# Patient Record
Sex: MALE | Race: WHITE | NOT HISPANIC OR LATINO | URBAN - METROPOLITAN AREA
[De-identification: names, ages, dates, MRNs, and addresses within clinical notes are randomized per-mention and may not be internally consistent; named-entity substitution may affect disease eponyms.]

---

## 2017-08-08 ENCOUNTER — EMERGENCY (EMERGENCY)
Facility: HOSPITAL | Age: 32
LOS: 1 days | Discharge: HOME | End: 2017-08-08
Attending: EMERGENCY MEDICINE | Admitting: EMERGENCY MEDICINE
Payer: SELF-PAY

## 2017-08-08 VITALS
HEART RATE: 67 BPM | DIASTOLIC BLOOD PRESSURE: 74 MMHG | OXYGEN SATURATION: 99 % | RESPIRATION RATE: 18 BRPM | WEIGHT: 139.99 LBS | TEMPERATURE: 97 F | SYSTOLIC BLOOD PRESSURE: 122 MMHG

## 2017-08-08 DIAGNOSIS — Z88.8 ALLERGY STATUS TO OTHER DRUGS, MEDICAMENTS AND BIOLOGICAL SUBSTANCES STATUS: ICD-10-CM

## 2017-08-08 DIAGNOSIS — Q76.49 OTHER CONGENITAL MALFORMATIONS OF SPINE, NOT ASSOCIATED WITH SCOLIOSIS: Chronic | ICD-10-CM

## 2017-08-08 DIAGNOSIS — Z88.1 ALLERGY STATUS TO OTHER ANTIBIOTIC AGENTS STATUS: ICD-10-CM

## 2017-08-08 DIAGNOSIS — R00.2 PALPITATIONS: ICD-10-CM

## 2017-08-08 PROCEDURE — 99284 EMERGENCY DEPT VISIT MOD MDM: CPT | Mod: 25

## 2017-08-08 PROCEDURE — 99053 MED SERV 10PM-8AM 24 HR FAC: CPT

## 2017-08-08 PROCEDURE — 99283 EMERGENCY DEPT VISIT LOW MDM: CPT | Mod: 25

## 2017-08-08 PROCEDURE — 93010 ELECTROCARDIOGRAM REPORT: CPT

## 2017-08-08 PROCEDURE — 93005 ELECTROCARDIOGRAM TRACING: CPT | Mod: 76

## 2017-08-08 NOTE — ED PROVIDER NOTE - MEDICAL DECISION MAKING DETAILS
Patient with normal ekg and vss. No ac distress. Patient was given information for drop in shelters. Patient appears well, nad.

## 2017-08-08 NOTE — ED ADULT NURSE NOTE - OBJECTIVE STATEMENT
30 yo M c.o palpitations since 330 am.  Pt states he started feeling palpitations after pushing his wheelchair at 330am.  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  Pt denies chest pain and SOB, numbness or tingling to bl upper and lower extremities. Pt A&Ox3 with no ss of acute distress at this time. EKG done, SR noted. Pt stable and will continue to monitor.

## 2017-08-08 NOTE — ED ADULT NURSE NOTE - CHPI ED SYMPTOMS NEG
no syncope/no chest pain/no back pain/no diaphoresis/no cough/no fever/no vomiting/no dizziness/no chills/no nausea/no shortness of breath

## 2017-08-08 NOTE — ED PROVIDER NOTE - ATTENDING CONTRIBUTION TO CARE
pt seen and examined by me.  31 year male co palpitations.  on exam, vitals hr 67 and normal bp.  ekg nsr.  pt appears comfortable, heart and lungs normal.  pt to fu pmd

## 2017-08-08 NOTE — ED ADULT NURSE NOTE - NURSING MUSC EXTREMITY NORMAL ROM
right lower extremity/left lower extremity/left upper extremity/right upper extremity/Pt has BL Lwer extremity deformity/ wheelchair bound

## 2017-08-08 NOTE — ED PROVIDER NOTE - OBJECTIVE STATEMENT
30 y/o m with h/o alcohol abuse, undomiciled, wheelchair bound due congenital CP. He states he just arrived from [place] and has no place to stay. + alcohol on breath. Report of feeling palpations to day. Denies fever, uri, cough, sob, n., v, abd pain.

## 2017-08-08 NOTE — ED PROVIDER NOTE - CHPI ED SYMPTOMS NEG
no vomiting/no fever/no shortness of breath/no diaphoresis/no chills/no dizziness/no nausea/no cough/no syncope

## 2017-08-25 ENCOUNTER — EMERGENCY (EMERGENCY)
Facility: HOSPITAL | Age: 32
LOS: 1 days | Discharge: HOME | End: 2017-08-25
Admitting: EMERGENCY MEDICINE
Payer: SELF-PAY

## 2017-08-25 VITALS
DIASTOLIC BLOOD PRESSURE: 53 MMHG | TEMPERATURE: 99 F | OXYGEN SATURATION: 97 % | HEART RATE: 77 BPM | WEIGHT: 95.02 LBS | RESPIRATION RATE: 16 BRPM | SYSTOLIC BLOOD PRESSURE: 109 MMHG

## 2017-08-25 DIAGNOSIS — Z88.1 ALLERGY STATUS TO OTHER ANTIBIOTIC AGENTS STATUS: ICD-10-CM

## 2017-08-25 DIAGNOSIS — Z03.89 ENCOUNTER FOR OBSERVATION FOR OTHER SUSPECTED DISEASES AND CONDITIONS RULED OUT: ICD-10-CM

## 2017-08-25 DIAGNOSIS — Z88.8 ALLERGY STATUS TO OTHER DRUGS, MEDICAMENTS AND BIOLOGICAL SUBSTANCES: ICD-10-CM

## 2017-08-25 DIAGNOSIS — Q76.49 OTHER CONGENITAL MALFORMATIONS OF SPINE, NOT ASSOCIATED WITH SCOLIOSIS: Chronic | ICD-10-CM

## 2017-08-25 PROCEDURE — 99282 EMERGENCY DEPT VISIT SF MDM: CPT

## 2017-08-25 PROCEDURE — 99281 EMR DPT VST MAYX REQ PHY/QHP: CPT

## 2017-08-25 NOTE — ED ADULT NURSE NOTE - OBJECTIVE STATEMENT
Pt stated "I may have touched some semen yesterday and I have abrasions on my fingers, so I'd like to get an HIV test." No other complaints. Pt is unkempt.

## 2017-08-25 NOTE — ED PROVIDER NOTE - MEDICAL DECISION MAKING DETAILS
exposure to sperm on intact skin. d/w pt no risk of transmission with intact skin. no PEP indicated.  d/w pt will need repeat HIV testing in 6 wks as testing today to early for event from yesterday. Pt declines testing at this time as he states not concerned about past HIV exposure. d/w pt would still recommend baseline testing. pt still refusing. f/u in clinic

## 2017-08-25 NOTE — ED ADULT TRIAGE NOTE - CHIEF COMPLAINT QUOTE
"I may of touched some semen yesterday and want HIV testing please because I may of had roughness to my finger tips".

## 2017-08-25 NOTE — ED PROVIDER NOTE - OBJECTIVE STATEMENT
32 y/o male requesting HIV testing. pt states he is concerned as he accidentally touched someone's sperm which was on the floor of a "porn shop". pt reports skin was intact to his fingers. pt reports washed hands immediately. no further complaints.

## 2018-12-16 NOTE — ED ADULT TRIAGE NOTE - AS TEMP SITE
PT is unable to stand or sit at this time d/t weakness and pain. Orthostatic vitals have not been obtained thus far. oral

## 2023-10-30 NOTE — ED ADULT NURSE NOTE - CCCP TRG CHIEF CMPLNT
palpitations Mucosal Advancement Flap Text: Given the location of the defect, shape of the defect and the proximity to free margins a mucosal advancement flap was deemed most appropriate. Incisions were made with a 15 blade scalpel in the appropriate fashion along the cutaneous vermilion border and the mucosal lip. The remaining actinically damaged mucosal tissue was excised.  The mucosal advancement flap was then elevated to the gingival sulcus with care taken to preserve the neurovascular structures and advanced into the primary defect. Care was taken to ensure that precise realignment of the vermilion border was achieved.

## 2025-07-25 NOTE — ED ADULT TRIAGE NOTE - NS ED NOTE AC HIGH RISK COUNTRIES
No General Sunscreen Counseling: I recommended a broad spectrum sunscreen with a SPF of 30 or higher.  I explained that SPF 30 sunscreens block approximately 97 percent of the sun's harmful rays.  Sunscreens should be applied at least 15 minutes prior to expected sun exposure and then every 2 hours after that as long as sun exposure continues. If swimming or exercising sunscreen should be reapplied every 45 minutes to an hour after getting wet or sweating.  One ounce, or the equivalent of a shot glass full of sunscreen, is adequate to protect the skin not covered by a bathing suit. I also recommended a lip balm with a sunscreen as well. Sun protective clothing can be used in lieu of sunscreen but must be worn the entire time you are exposed to the sun's rays. Detail Level: Detailed Products Recommended: Blue Lizard